# Patient Record
Sex: FEMALE | Race: AMERICAN INDIAN OR ALASKA NATIVE
[De-identification: names, ages, dates, MRNs, and addresses within clinical notes are randomized per-mention and may not be internally consistent; named-entity substitution may affect disease eponyms.]

---

## 2019-06-10 ENCOUNTER — HOSPITAL ENCOUNTER (EMERGENCY)
Dept: HOSPITAL 5 - ED | Age: 54
Discharge: HOME | End: 2019-06-10
Payer: COMMERCIAL

## 2019-06-10 VITALS — DIASTOLIC BLOOD PRESSURE: 68 MMHG | SYSTOLIC BLOOD PRESSURE: 141 MMHG

## 2019-06-10 DIAGNOSIS — R07.2: Primary | ICD-10-CM

## 2019-06-10 DIAGNOSIS — K80.80: ICD-10-CM

## 2019-06-10 LAB
APTT BLD: 24.7 SEC. (ref 24.2–36.6)
BASOPHILS # (AUTO): 0 K/MM3 (ref 0–0.1)
BASOPHILS NFR BLD AUTO: 0.7 % (ref 0–1.8)
BILIRUB UR QL STRIP: (no result)
BLOOD UR QL VISUAL: (no result)
BUN SERPL-MCNC: 8 MG/DL (ref 7–17)
BUN/CREAT SERPL: 11 %
CALCIUM SERPL-MCNC: 9.4 MG/DL (ref 8.4–10.2)
EOSINOPHIL # BLD AUTO: 0.1 K/MM3 (ref 0–0.4)
EOSINOPHIL NFR BLD AUTO: 1.9 % (ref 0–4.3)
HCT VFR BLD CALC: 44.2 % (ref 30.3–42.9)
HEMOLYSIS INDEX: 7
HGB BLD-MCNC: 14.6 GM/DL (ref 10.1–14.3)
INR PPP: 0.93 (ref 0.87–1.13)
LYMPHOCYTES # BLD AUTO: 1.9 K/MM3 (ref 1.2–5.4)
LYMPHOCYTES NFR BLD AUTO: 32.7 % (ref 13.4–35)
MCHC RBC AUTO-ENTMCNC: 33 % (ref 30–34)
MCV RBC AUTO: 89 FL (ref 79–97)
MONOCYTES # (AUTO): 0.7 K/MM3 (ref 0–0.8)
MONOCYTES % (AUTO): 12.7 % (ref 0–7.3)
PH UR STRIP: 6 [PH] (ref 5–7)
PLATELET # BLD: 209 K/MM3 (ref 140–440)
PROT UR STRIP-MCNC: (no result) MG/DL
RBC # BLD AUTO: 4.98 M/MM3 (ref 3.65–5.03)
RBC #/AREA URNS HPF: 1 /HPF (ref 0–6)
UROBILINOGEN UR-MCNC: < 2 MG/DL (ref ?–2)
WBC #/AREA URNS HPF: < 1 /HPF (ref 0–6)

## 2019-06-10 PROCEDURE — 84484 ASSAY OF TROPONIN QUANT: CPT

## 2019-06-10 PROCEDURE — 93010 ELECTROCARDIOGRAM REPORT: CPT

## 2019-06-10 PROCEDURE — 85610 PROTHROMBIN TIME: CPT

## 2019-06-10 PROCEDURE — 83690 ASSAY OF LIPASE: CPT

## 2019-06-10 PROCEDURE — 93005 ELECTROCARDIOGRAM TRACING: CPT

## 2019-06-10 PROCEDURE — 96375 TX/PRO/DX INJ NEW DRUG ADDON: CPT

## 2019-06-10 PROCEDURE — 96374 THER/PROPH/DIAG INJ IV PUSH: CPT

## 2019-06-10 PROCEDURE — 81001 URINALYSIS AUTO W/SCOPE: CPT

## 2019-06-10 PROCEDURE — 85025 COMPLETE CBC W/AUTO DIFF WBC: CPT

## 2019-06-10 PROCEDURE — 71046 X-RAY EXAM CHEST 2 VIEWS: CPT

## 2019-06-10 PROCEDURE — 71275 CT ANGIOGRAPHY CHEST: CPT

## 2019-06-10 PROCEDURE — 36415 COLL VENOUS BLD VENIPUNCTURE: CPT

## 2019-06-10 PROCEDURE — 85730 THROMBOPLASTIN TIME PARTIAL: CPT

## 2019-06-10 PROCEDURE — 99284 EMERGENCY DEPT VISIT MOD MDM: CPT

## 2019-06-10 PROCEDURE — 80048 BASIC METABOLIC PNL TOTAL CA: CPT

## 2019-06-10 NOTE — EMERGENCY DEPARTMENT REPORT
<DEL HAYES - Last Filed: 06/10/19 17:06>





ED General Adult HPI





- General


Chief complaint: Chest Pain


Stated complaint: CHEST PAIN/ORANGE URINE


Time Seen by Provider: 06/10/19 14:07





- Related Data


                                    Allergies











Allergy/AdvReac Type Severity Reaction Status Date / Time


 


No Known Allergies Allergy   Unverified 06/10/19 10:22














ED Medical Decision Making





- Lab Data


Result diagrams: 


                                 06/10/19 10:39





                                 06/10/19 10:39





ED Disposition


Clinical Impression: 


 Nonspecific chest pain, Gallstones





Disposition: DC-01 TO HOME OR SELFCARE


Is pt being admited?: No


Does the pt Need Aspirin: No


Condition: Stable


Instructions:  Chest Pain (ED)


Additional Instructions: 


DIET AS TOLERATED





MEDS AS ORDERED TODAY IN ER


FOLLOW INSTRUCTIONS ON THE BOTTLE





FOLLOW UP PCP WITHIN 48 HOURS TO ENSURE YOU ARE GETTING BETTER


YOU HAVE BEEN GIVEN REFERRALS TO GI AND CARDIOLOGY PHYSICIANS- SEE BELOW





ACTIVITY AS TOLERATED





MOTRIN OR TYLENOL FOR PAIN OR FEVER





RETURN TO THE ER FOR WORSENING SYMPTOMS NOT RELIEVED BY YOUR MEDICATIONS.











Referrals: 


HALLE SANTIAGO MD [Primary Care Provider] - 3-5 Days


[facility] [Outside] - 3-5 Days


SHERITA PINA MD [Staff Physician] - 3-5 Days


Forms:  Work/School Release Form(ED)


Time of Disposition: 17:06





<SUPA NOVAK - Last Filed: 06/11/19 10:19>





ED General Adult HPI





- General


Source: patient


Mode of arrival: Ambulatory


Limitations: No Limitations





- History of Present Illness


Initial comments: 





Patient presents to the emergency department with a chief complaint of 

consistent substernal chest pain for the last 2 weeks.  Patient states it feels 

as if something is stuck in her esophagus.  Patient states that this started 2 

weeks ago after vomiting blood.  Patient has a remote history of having an EGD 

done 4 years ago without any acute findings.


-: Sudden


Location: chest


Radiation: non-radiation


Severity scale (0 -10): 5


Quality: sharp


Consistency: constant


Improves with: none


Worsens with: none


Associated Symptoms: denies other symptoms


Treatments Prior to Arrival: none





ED Review of Systems


ROS: 


Stated complaint: CHEST PAIN/ORANGE URINE


Other details as noted in HPI





Constitutional: denies: chills, fever


Eyes: denies: eye pain, eye discharge, vision change


ENT: denies: ear pain, throat pain


Respiratory: denies: cough, shortness of breath, wheezing


Cardiovascular: chest pain.  denies: palpitations


Endocrine: no symptoms reported


Gastrointestinal: denies: abdominal pain, nausea, diarrhea


Genitourinary: denies: urgency, dysuria, discharge


Musculoskeletal: denies: back pain, joint swelling, arthralgia


Skin: denies: rash, lesions


Neurological: denies: headache, weakness, paresthesias


Psychiatric: denies: anxiety, depression


Hematological/Lymphatic: denies: easy bleeding, easy bruising





ED Past Medical Hx





- Past Medical History


Previous Medical History?: No





- Surgical History


Past Surgical History?: No





- Social History


Smoking Status: Never Smoker


Substance Use Type: None





ED Physical Exam





- General


Limitations: No Limitations


General appearance: alert, in no apparent distress





- Head


Head exam: Present: atraumatic, normocephalic





- Eye


Eye exam: Present: normal appearance, PERRL, EOMI





- ENT


ENT exam: Present: mucous membranes moist





- Neck


Neck exam: Present: normal inspection





- Respiratory


Respiratory exam: Present: normal lung sounds bilaterally, chest wall 

tenderness.  Absent: respiratory distress





- Cardiovascular


Cardiovascular Exam: Present: regular rate, normal rhythm.  Absent: systolic 

murmur, diastolic murmur, rubs, gallop





- GI/Abdominal


GI/Abdominal exam: Present: soft, normal bowel sounds.  Absent: distended, 

tenderness





- Extremities Exam


Extremities exam: Present: normal inspection





- Back Exam


Back exam: Present: normal inspection





- Neurological Exam


Neurological exam: Present: alert, oriented X3, CN II-XII intact.  Absent: motor

sensory deficit





- Psychiatric


Psychiatric exam: Present: normal affect, normal mood





- Skin


Skin exam: Present: warm, dry, intact, normal color.  Absent: rash





ED Course


                                   Vital Signs











  06/10/19 06/10/19 06/10/19





  10:29 15:14 17:53


 


Temperature 98.2 F  


 


Pulse Rate 91 H  68


 


Respiratory 18 17 16





Rate   


 


Blood Pressure 130/81  


 


Blood Pressure   141/68





[Left]   


 


O2 Sat by Pulse 99  98





Oximetry   














ED Medical Decision Making





- Lab Data


Result diagrams: 


                                 06/10/19 10:39





                                 06/10/19 10:39








                                   Lab Results











  06/10/19 06/10/19 06/10/19 Range/Units





  10:39 10:39 14:12 


 


WBC  5.7    (4.5-11.0)  K/mm3


 


RBC  4.98    (3.65-5.03)  M/mm3


 


Hgb  14.6 H    (10.1-14.3)  gm/dl


 


Hct  44.2 H    (30.3-42.9)  %


 


MCV  89    (79-97)  fl


 


MCH  29    (28-32)  pg


 


MCHC  33    (30-34)  %


 


RDW  14.5    (13.2-15.2)  %


 


Plt Count  209    (140-440)  K/mm3


 


Lymph % (Auto)  32.7    (13.4-35.0)  %


 


Mono % (Auto)  12.7 H    (0.0-7.3)  %


 


Eos % (Auto)  1.9    (0.0-4.3)  %


 


Baso % (Auto)  0.7    (0.0-1.8)  %


 


Lymph #  1.9    (1.2-5.4)  K/mm3


 


Mono #  0.7    (0.0-0.8)  K/mm3


 


Eos #  0.1    (0.0-0.4)  K/mm3


 


Baso #  0.0    (0.0-0.1)  K/mm3


 


Seg Neutrophils %  52.0    (40.0-70.0)  %


 


Seg Neutrophils #  3.0    (1.8-7.7)  K/mm3


 


PT     (12.2-14.9)  Sec.


 


INR     (0.87-1.13)  


 


APTT     (24.2-36.6)  Sec.


 


Sodium   144   (137-145)  mmol/L


 


Potassium   4.3   (3.6-5.0)  mmol/L


 


Chloride   105.3   ()  mmol/L


 


Carbon Dioxide   24   (22-30)  mmol/L


 


Anion Gap   19   mmol/L


 


BUN   8   (7-17)  mg/dL


 


Creatinine   0.7   (0.7-1.2)  mg/dL


 


Estimated GFR   > 60   ml/min


 


BUN/Creatinine Ratio   11   %


 


Glucose   100   ()  mg/dL


 


Calcium   9.4   (8.4-10.2)  mg/dL


 


Troponin T   < 0.010  < 0.010  (0.00-0.029)  ng/mL


 


Lipase   25   (13-60)  units/L


 


Urine Color     (Yellow)  


 


Urine Turbidity     (Clear)  


 


Urine pH     (5.0-7.0)  


 


Ur Specific Gravity     (1.003-1.030)  


 


Urine Protein     (Negative)  mg/dL


 


Urine Glucose (UA)     (Negative)  mg/dL


 


Urine Ketones     (Negative)  mg/dL


 


Urine Blood     (Negative)  


 


Urine Nitrite     (Negative)  


 


Urine Bilirubin     (Negative)  


 


Urine Urobilinogen     (<2.0)  mg/dL


 


Ur Leukocyte Esterase     (Negative)  


 


Urine WBC (Auto)     (0.0-6.0)  /HPF


 


Urine RBC (Auto)     (0.0-6.0)  /HPF


 


U Epithel Cells (Auto)     (0-13.0)  /HPF














  06/10/19 06/10/19 Range/Units





  14:55 17:32 


 


WBC    (4.5-11.0)  K/mm3


 


RBC    (3.65-5.03)  M/mm3


 


Hgb    (10.1-14.3)  gm/dl


 


Hct    (30.3-42.9)  %


 


MCV    (79-97)  fl


 


MCH    (28-32)  pg


 


MCHC    (30-34)  %


 


RDW    (13.2-15.2)  %


 


Plt Count    (140-440)  K/mm3


 


Lymph % (Auto)    (13.4-35.0)  %


 


Mono % (Auto)    (0.0-7.3)  %


 


Eos % (Auto)    (0.0-4.3)  %


 


Baso % (Auto)    (0.0-1.8)  %


 


Lymph #    (1.2-5.4)  K/mm3


 


Mono #    (0.0-0.8)  K/mm3


 


Eos #    (0.0-0.4)  K/mm3


 


Baso #    (0.0-0.1)  K/mm3


 


Seg Neutrophils %    (40.0-70.0)  %


 


Seg Neutrophils #    (1.8-7.7)  K/mm3


 


PT  13.0   (12.2-14.9)  Sec.


 


INR  0.93   (0.87-1.13)  


 


APTT  24.7   (24.2-36.6)  Sec.


 


Sodium    (137-145)  mmol/L


 


Potassium    (3.6-5.0)  mmol/L


 


Chloride    ()  mmol/L


 


Carbon Dioxide    (22-30)  mmol/L


 


Anion Gap    mmol/L


 


BUN    (7-17)  mg/dL


 


Creatinine    (0.7-1.2)  mg/dL


 


Estimated GFR    ml/min


 


BUN/Creatinine Ratio    %


 


Glucose    ()  mg/dL


 


Calcium    (8.4-10.2)  mg/dL


 


Troponin T    (0.00-0.029)  ng/mL


 


Lipase    (13-60)  units/L


 


Urine Color   [place]  (Yellow)  


 


Urine Turbidity   Clear  (Clear)  


 


Urine pH   6.0  (5.0-7.0)  


 


Ur Specific Gravity   > 1.059 H  (1.003-1.030)  


 


Urine Protein   <15 mg/dl  (Negative)  mg/dL


 


Urine Glucose (UA)   Neg  (Negative)  mg/dL


 


Urine Ketones   Tr  (Negative)  mg/dL


 


Urine Blood   Neg  (Negative)  


 


Urine Nitrite   Neg  (Negative)  


 


Urine Bilirubin   Neg  (Negative)  


 


Urine Urobilinogen   < 2.0  (<2.0)  mg/dL


 


Ur Leukocyte Esterase   Neg  (Negative)  


 


Urine WBC (Auto)   < 1.0  (0.0-6.0)  /HPF


 


Urine RBC (Auto)   1.0  (0.0-6.0)  /HPF


 


U Epithel Cells (Auto)   1.0  (0-13.0)  /HPF














- EKG Data


-: EKG Interpreted by Me


EKG shows normal: sinus rhythm


Rate: normal





- Medical Decision Making





Results discussed with patient


Critical care attestation.: 


If time is entered above; I have spent that time in minutes in the direct care 

of this critically ill patient, excluding procedure time.

## 2019-06-10 NOTE — CAT SCAN REPORT
PROCEDURE: CT ANGIO CHEST 

 

TECHNIQUE:  Computerized tomographic angiography of the chest was performed after the IV injection of
 iodinated nonionic contrast including image processing.  The image data was postprocessed using 2-di
mensional multiplanar reformatted (MPR) and 3-dimensional (MIP and/or volume rendered) techniques. Au
tomated exposure control, adjustment of mA and/or kV according to patient size, or iterative reconstr
uction dose optimization techniques were utilized.  

 

CT DOSE LENGTH PRODUCT:  643.7  mGycm 

 

HISTORY: chest pain with hematemesis 

 

COMPARISONS:  None . 

 

FINDINGS: 

 

Heart and pericardium: Normal. 

Thoracic aorta:  Normal. 

Pulmonary vasculature: Normal. 

Lymph nodes:  No enlarged thoracic lymph nodes. 

Lungs:  Normal. 

Pleural space:  No effusion, thickening, or pneumothorax. 

Musculoskeletal structures:  No significant abnormality. 

Upper abdominal structures: Multiple dense foci are present within the lumen of the gallbladder 

 

IMPRESSION: Cholelithiasis 

 

No CT evidence for acute pulmonary embolus 

 

This document is electronically signed by Corey Jenkins MD., Mandi 10 2019 04:54:58 PM ET